# Patient Record
Sex: MALE | Race: WHITE | NOT HISPANIC OR LATINO | URBAN - METROPOLITAN AREA
[De-identification: names, ages, dates, MRNs, and addresses within clinical notes are randomized per-mention and may not be internally consistent; named-entity substitution may affect disease eponyms.]

---

## 2017-08-09 ENCOUNTER — EMERGENCY (EMERGENCY)
Facility: HOSPITAL | Age: 52
LOS: 1 days | Discharge: HOME | End: 2017-08-09
Admitting: EMERGENCY MEDICINE
Payer: COMMERCIAL

## 2017-08-09 VITALS
TEMPERATURE: 98 F | OXYGEN SATURATION: 99 % | WEIGHT: 164.91 LBS | HEART RATE: 91 BPM | DIASTOLIC BLOOD PRESSURE: 73 MMHG | RESPIRATION RATE: 16 BRPM | SYSTOLIC BLOOD PRESSURE: 106 MMHG

## 2017-08-09 PROCEDURE — 99284 EMERGENCY DEPT VISIT MOD MDM: CPT

## 2017-08-09 PROCEDURE — 99283 EMERGENCY DEPT VISIT LOW MDM: CPT

## 2017-08-09 RX ORDER — EPINEPHRINE 0.3 MG/.3ML
0.3 INJECTION INTRAMUSCULAR; SUBCUTANEOUS
Qty: 2 | Refills: 0 | OUTPATIENT
Start: 2017-08-09

## 2023-06-22 ENCOUNTER — EMERGENCY (EMERGENCY)
Facility: HOSPITAL | Age: 58
LOS: 1 days | Discharge: HOME | End: 2023-06-22
Attending: EMERGENCY MEDICINE
Payer: COMMERCIAL

## 2023-06-22 VITALS
SYSTOLIC BLOOD PRESSURE: 111 MMHG | DIASTOLIC BLOOD PRESSURE: 78 MMHG | RESPIRATION RATE: 16 BRPM | OXYGEN SATURATION: 98 % | TEMPERATURE: 98 F | HEART RATE: 64 BPM

## 2023-06-22 VITALS — WEIGHT: 164.91 LBS | HEIGHT: 74 IN

## 2023-06-22 PROCEDURE — 90377 RABIES IG HT&SOL HUMAN IM/SC: CPT

## 2023-06-22 PROCEDURE — 99284 EMERGENCY DEPT VISIT MOD MDM: CPT

## 2023-06-22 PROCEDURE — 90471 IMMUNIZATION ADMIN: CPT

## 2023-06-22 PROCEDURE — 90715 TDAP VACCINE 7 YRS/> IM: CPT

## 2023-06-22 PROCEDURE — 90472 IMMUNIZATION ADMIN EACH ADD: CPT

## 2023-06-22 PROCEDURE — 90675 RABIES VACCINE IM: CPT

## 2023-06-22 PROCEDURE — 99283 EMERGENCY DEPT VISIT LOW MDM: CPT | Mod: 25

## 2023-06-22 PROCEDURE — 96372 THER/PROPH/DIAG INJ SC/IM: CPT

## 2023-06-22 RX ORDER — TETANUS TOXOID, REDUCED DIPHTHERIA TOXOID AND ACELLULAR PERTUSSIS VACCINE, ADSORBED 5; 2.5; 8; 8; 2.5 [IU]/.5ML; [IU]/.5ML; UG/.5ML; UG/.5ML; UG/.5ML
0.5 SUSPENSION INTRAMUSCULAR ONCE
Refills: 0 | Status: COMPLETED | OUTPATIENT
Start: 2023-06-22 | End: 2023-06-22

## 2023-06-22 RX ORDER — HUMAN RABIES VIRUS IMMUNE GLOBULIN 150 [IU]/ML
1500 INJECTION, SOLUTION INTRAMUSCULAR ONCE
Refills: 0 | Status: COMPLETED | OUTPATIENT
Start: 2023-06-22 | End: 2023-06-22

## 2023-06-22 RX ORDER — RABIES VACC, HUMAN DIPLOID/PF 2.5 UNIT
1 VIAL (EA) INTRAMUSCULAR ONCE
Refills: 0 | Status: COMPLETED | OUTPATIENT
Start: 2023-06-22 | End: 2023-06-22

## 2023-06-22 RX ADMIN — Medication 1 TABLET(S): at 12:31

## 2023-06-22 RX ADMIN — HUMAN RABIES VIRUS IMMUNE GLOBULIN 1500 UNIT(S): 150 INJECTION, SOLUTION INTRAMUSCULAR at 13:04

## 2023-06-22 RX ADMIN — TETANUS TOXOID, REDUCED DIPHTHERIA TOXOID AND ACELLULAR PERTUSSIS VACCINE, ADSORBED 0.5 MILLILITER(S): 5; 2.5; 8; 8; 2.5 SUSPENSION INTRAMUSCULAR at 12:37

## 2023-06-22 RX ADMIN — Medication 1 MILLILITER(S): at 12:31

## 2023-06-22 NOTE — ED ADULT NURSE NOTE - HISTORY OF COVID-19 VACCINATION
Dr. Brad Wilkerson gave me a prescription for medication for my stomach.  It did not help.  My stomach continues to hurt on a daily basis. Hyla Celina and every time that I eat, I get a \"stomach ache\".  Please give me a referral to see Dr. Marquita Kline. Thank you. Yes

## 2023-06-22 NOTE — ED ADULT NURSE NOTE - OBJECTIVE STATEMENT
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 Pt states cat did not have collar or any type of identification. Pt states this happened about an hour prior to evaluation in ED. Puncture wounds noted to area of bite, pt has full sensation and ROM in upper extremities and hands, no redness or swelling around bits, radial pulses present and equal bilaterally. Pt denies any other medical complaints. A&Ox4, breathing spontaneously and unlabored, speaking full sentences, moving all extremities, is ambulatory.

## 2023-06-22 NOTE — ED PROVIDER NOTE - CARE PLAN
1 Principal Discharge DX:	Cat bite of finger   Principal Discharge DX:	Open cat bite of hand  Goal:	bilateral

## 2023-06-22 NOTE — ED PROVIDER NOTE - OBJECTIVE STATEMENT
HPI & ROS: 58-year-old male history of emphysema, unknown last tetanus, unknown last rabies, presenting approximately 1 hour after a cat bite.  Presumed feral, patient was outside trying to  a cat and was bitten once on his left hand and his right hand.  Bleeding controlled.   No fevers no chills, no skin erythema.  Patient has a wound over the dorsal aspect of his first webspace on his left hand in the first webspace on his right hand.  Neurovascular intact.  Able to move both hands and flex and extend fully.

## 2023-06-22 NOTE — ED PROVIDER NOTE - PATIENT PORTAL LINK FT
You can access the FollowMyHealth Patient Portal offered by [facility] by registering at the following website: [email or web address]. By joining [employer]’s FollowMyHealth portal, you will also be able to view your health information using other applications (apps) compatible with our system.

## 2023-06-22 NOTE — ED PROVIDER NOTE - ATTENDING CONTRIBUTION TO CARE
Attending MD Siu: I personally have seen and examined this patient.  Resident note reviewed and agree on plan of care and except where noted.  See below for details.     Seen in [facility]    58M with PMH/PSH including emphysema, quit tobacco presents to the ED with cat bites to bilateral hands.  Reports that  were trying to help injured stray cat, he attempted to help, in trying to get cat into box, cat bit him through thick gloves.  Reports he is R hand dominant.  Unknown last tetanus.  Reports washed hands and has been trying to express area since time of incident at 10am.  Denies limitation of motor function or loss of sensory.  Denies redness or purulence to area.  Denies other complaints.     Exam:   General: NAD  HENT: head NCAT, airway patent   Chest: symmetric chest rise, no increased work of breathing  MSK: ranging neck freely, FROM at bilateral hands, cap refill <2s, +2 radials, +R hand with 1cm avulsion injury at interdigit web space between thumb and index finger, +L hand with 2 wounds in interdigit web space between thumb and index finger: one 2mm superficial linear wound and the other at 0.5cm wound, wounds all explored to base in bloodless field with no retained foreign body  Neuro: moving all extremities spontaneously, sensory grossly intact, no gross neuro deficits  Psych: normal mood and affect     A/P: 58M with cat bites to bilateral hands, given reported stray, will give rabies IG, rabies vaccine and will update tetanus, will need to return on Days 3, 7, 14.  Will fill out animal bite form.

## 2023-06-22 NOTE — ED PROVIDER NOTE - CLINICAL SUMMARY MEDICAL DECISION MAKING FREE TEXT BOX
DDx:   Exam and history not consistent with infection of the tendon or ligament.  Of the tendon sheath.  Likely isolated cat bite left and right, will presume feral at this time.  Rabies and tetanus unknown. FB possible as well   Labs: none   Imaging: xray L R hand   Tx:  rabies tetanus  Consults/Resources:   Dispo:    Triage note reviewed. VS reviewed. EKG reviewed and documented in "RESULTS" section, if possible at given time.     DDx in MDM includes the most likely ddx, but is not limited to solely what is listed. Progress notes written as needed, and are included in "PROGRESS NOTE" section below.       Medical, family, and social determinants of health reviewed and discussed w/ pt/family/caretaker, when allowable, and is incorporated into note above, whenever possible.

## 2023-06-22 NOTE — ED PROVIDER NOTE - PHYSICAL EXAMINATION
Exam as stated below:   CONSTITUTIONAL: In NAD.   SKIN: Warm dry. No rashes noted.   EYES: No scleral icterus. Conjunctiva pink.  MSK: No pedal edema. No calf tenderness.   Patient able to range and give A-OK sign with both hands.  Flexion extension intact for all digits and at the wrist as well without pain or limitation.  No erythema noted around bites.  2 punctate bites on the left web, with 1 larger punctate bite on the right web.  NEURO: UE/LE grossly intact. Motor UE/LE sensation grossly intact. CN II-XII grossly intact.   PSYCH: Cooperative, appropriate.

## 2023-06-22 NOTE — ED PROVIDER NOTE - NSFOLLOWUPINSTRUCTIONS_ED_ALL_ED_FT
-     - Your testing/exams was/were reassuring that dangerous emergencies/conditions are less likely to be occurring or to have occurred.    - Take all medications, if given/sent to pharmacy, and as, directed.    - If you had labs or imaging done, you were given copies of all labs and/or imaging results from your er visit--please take them with you to your follow up appointments.  - If needed, call patient access services at [phone] to find a primary care physician (PCP). Call this number to follow up with a specialty service, such as the spine clinic. If you need this, call and say you were recently in the emergency department and you are calling, per my orders.   - Make sure you do not require a primary care physician's referral if you make a specialty clinic appointment directly. Some insurance requires you to see your PCP, get a referral, then make a specialty appointment. - You were seen today after being bitten by a cat whose vaccination and health status is unknown.      - Today you received:  -- TDAP vaccine booster   -- Rabies vaccine (today is considered Day 0)  -- Rabies immunoglobulin.    You need to return on:  Day 3 (6/25/23)  Day 7 (6/29/23)  Day 14 (7/6/23)    - Take home medications as directed.     - You were prescribed AUGMENTIN.  Please take this antibiotics twice daily until you have finished all the pills.    FOR PAIN, YOU MAY TAKE TYLENOL (ACETAMINOPHEN) AND/OR IBUPROFEN (Advil or Motrin). FOLLOW THE INSTRUCTIONS ON THE LABEL/CONTAINER.  DO NOT EXCEED 4000MG OF TYLENOL (ACETAMINOPHEN) IN A 24 HOUR PERIOD.    RETURN TO THE EMERGENCY DEPARTMENT IF YOU EXPERIENCE ANY NEW/CONCERNING/WORSENING SYMPTOMS SUCH AS BUT NOT LIMITED TO: swelling, severe pain, redness, or bleeding at the vaccine or immunoglobulin injection site, fever, bad smell coming from wound or dressing, severe pain, increased redness, swelling, or pain at the site of your wound, fluid, blood, or pus coming from your wound, rash, red streak going away from wound, you cannot properly move area of wound, if extremity with wound is pale or blue or any other concerns.      - If you had labs or imaging done, you were given copies of all labs and/or imaging results from your er visit--please take them with you to your follow up appointments.  - If needed, call patient access services at [phone] to find a primary care physician (PCP). Call this number to follow up with a specialty service, such as the spine clinic. If you need this, call and say you were recently in the emergency department and you are calling, per my orders.   - Make sure you do not require a primary care physician's referral if you make a specialty clinic appointment directly. Some insurance requires you to see your PCP, get a referral, then make a specialty appointment.

## 2023-06-22 NOTE — ED ADULT NURSE NOTE - NSFALLUNIVINTERV_ED_ALL_ED
Bed/Stretcher in lowest position, wheels locked, appropriate side rails in place/Call bell, personal items and telephone in reach/Instruct patient to call for assistance before getting out of bed/chair/stretcher/Non-slip footwear applied when patient is off stretcher/[place] to call system/Physically safe environment - no spills, clutter or unnecessary equipment/Purposeful proactive rounding/Room/bathroom lighting operational, light cord in reach

## 2023-06-25 ENCOUNTER — EMERGENCY (EMERGENCY)
Facility: HOSPITAL | Age: 58
LOS: 1 days | Discharge: HOME | End: 2023-06-25
Attending: PERSONAL EMERGENCY RESPONSE ATTENDANT
Payer: COMMERCIAL

## 2023-06-25 VITALS
DIASTOLIC BLOOD PRESSURE: 67 MMHG | HEIGHT: 74 IN | TEMPERATURE: 98 F | WEIGHT: 164.91 LBS | RESPIRATION RATE: 18 BRPM | OXYGEN SATURATION: 97 % | SYSTOLIC BLOOD PRESSURE: 112 MMHG | HEART RATE: 67 BPM

## 2023-06-25 PROCEDURE — 99284 EMERGENCY DEPT VISIT MOD MDM: CPT

## 2023-06-25 PROCEDURE — 90675 RABIES VACCINE IM: CPT

## 2023-06-25 PROCEDURE — 73130 X-RAY EXAM OF HAND: CPT

## 2023-06-25 PROCEDURE — 99283 EMERGENCY DEPT VISIT LOW MDM: CPT | Mod: 25

## 2023-06-25 PROCEDURE — 90471 IMMUNIZATION ADMIN: CPT

## 2023-06-25 PROCEDURE — 73130 X-RAY EXAM OF HAND: CPT | Mod: 26,RT

## 2023-06-25 RX ORDER — RABIES VACC, HUMAN DIPLOID/PF 2.5 UNIT
1 VIAL (EA) INTRAMUSCULAR ONCE
Refills: 0 | Status: COMPLETED | OUTPATIENT
Start: 2023-06-25 | End: 2023-06-25

## 2023-06-25 RX ADMIN — Medication 1 MILLILITER(S): at 11:42

## 2023-06-25 NOTE — ED PROVIDER NOTE - PATIENT PORTAL LINK FT
You can access the FollowMyHealth Patient Portal offered by [facility] by registering at the following website: [email or web address]. By joining [employer]’s FollowMyHealth portal, you will also be able to view your health information using other applications (apps) compatible with our system.

## 2023-06-25 NOTE — ED ADULT NURSE NOTE - NSFALLUNIVINTERV_ED_ALL_ED
Bed/Stretcher in lowest position, wheels locked, appropriate side rails in place/Call bell, personal items and telephone in reach/Instruct patient to call for assistance before getting out of bed/chair/stretcher/Non-slip footwear applied when patient is off stretcher/[place] to call system/Physically safe environment - no spills, clutter or unnecessary equipment/Purposeful proactive rounding/Room/bathroom lighting operational, light cord in reach

## 2023-06-25 NOTE — ED PROVIDER NOTE - PHYSICAL EXAMINATION
Const: Well-nourished, Well-developed, appearing stated age.  Eyes: no conjunctival injection, and symmetrical lids.  HEENT: Head NCAT, no lesions. Atraumatic external nose and ears. Moist MM.  Neck: Symmetric, trachea midline.   RESP: Unlabored respiratory effort.   MSK:   TTP over right index medical.  Range of motion intact  Skin:   Right hand well-healing bite marks, mild erythema around site but not on edematous, not spreading.  No purulent drainage from site.  Neuro: CNs II-XII grossly intact. Motor & Sensation grossly intact.  Psych: Awake, Alert, & Oriented (AAO) x3. Appropriate mood and affect.

## 2023-06-25 NOTE — ED PROVIDER NOTE - OBJECTIVE STATEMENT
58-year-old male no past medical history presenting to the ED with follow-up rabies vaccination.  Patient got his first shot for day 0 and received Tdap at the same time.,   Patient is taking his Augmentin but notes that he still has pain in the right hand,  specifically tenderness over the right first knuckle.  Mild erythema around the site but no purulent drainage, no sever tenderness. no f/c, has been moving his fingers normally.

## 2023-06-25 NOTE — ED PROVIDER NOTE - CLINICAL SUMMARY MEDICAL DECISION MAKING FREE TEXT BOX
58-year-old male no history presenting with likely need for day 3 of rabies vaccination.  Patient complaining of pain in the right knuckle.  But is able to range.  Low suspicion for tenosynovitis as this finger is not swollen hot and tender and can range it.  Patient had does not look cellulitic or infected at this time.  We will get x-rays to ensure no foreign body versus fracture in the hand 58-year-old male no history presenting with likely need for day 3 of rabies vaccination.  Patient complaining of pain in the right knuckle.  But is able to range.  Low suspicion for tenosynovitis as this finger is not swollen hot and tender and can range it.  Patient had does not look cellulitic or infected at this time.  We will get x-rays to ensure no foreign body versus fracture in the hand    Attending MD Naik.  Agree with above.  Pt is a 57 yo male with pmhx of bit by cat few days ago.  Returns to ED for day 3 rabies vaccine.  Taking augmentin as rxed.  Pain over R 2nd MCP joint with limited ROM 2/2 pain .  No focal erythema/warmth/induration.  Site visually healing well.  TTP over puncture wounds focally without induration/palpable FB's.  XR's reviewed without FB's appreciated.  Pt endorses fever previously on Thursday night of unclear significance.  No recurrent fevers.  PT afebrile today, sites non-erythematous.  Extensive discussion with pt re: need to return to ED for fevers/chills/erythema, warmth, n/v/d.  Able to make fist and articulate thumb to all digits with minimal limitation 2/2 pain.  Pt aware of need to return to ED next on day

## 2023-06-25 NOTE — ED PROVIDER NOTE - ATTENDING CONTRIBUTION TO CARE
Attending MD Naik:  I performed a history and physical exam of the patient and discussed their management with the resident. I reviewed the resident's note and agree with the documented findings and plan of care. My medical decision making and observations are found above.

## 2023-06-25 NOTE — ED PROVIDER NOTE - ADDITIONAL NOTES AND INSTRUCTIONS:
This patient was seen in the Emergency Department. Please avoid any strenuous physical with your hands at this time until cleared by a physician.

## 2023-06-25 NOTE — ED PROVIDER NOTE - PROGRESS NOTE DETAILS
Oneil Carreno, PGY2 Show no acute findings.  Patient received vaccine with no acute issues.  Feels well at bedside.  Will DC with return cautions.

## 2023-06-25 NOTE — ED ADULT NURSE NOTE - OBJECTIVE STATEMENT
58 year old male here for second rabies shot.  Pt was bitten by stray cast Pt has scabs on the right had and endorsees pain On the  first knuckle .  no redness no fever chills noted Jamari

## 2023-06-25 NOTE — ED PROVIDER NOTE - NSFOLLOWUPINSTRUCTIONS_ED_ALL_ED_FT
- You were seen today after being bitten by a cat whose vaccination and health status is unknown.      - Today you received:  -- Rabies vaccine (today is considered Day 3)    You need to return on:  Day 7 (6/29/23)  Day 14 (7/6/23)    FOR PAIN, YOU MAY TAKE TYLENOL (ACETAMINOPHEN) AND/OR IBUPROFEN (Advil or Motrin). FOLLOW THE INSTRUCTIONS ON THE LABEL/CONTAINER.  DO NOT EXCEED 4000MG OF TYLENOL (ACETAMINOPHEN) IN A 24 HOUR PERIOD.    RETURN TO THE EMERGENCY DEPARTMENT IF YOU EXPERIENCE ANY NEW/CONCERNING/WORSENING SYMPTOMS SUCH AS BUT NOT LIMITED TO: swelling, severe pain, redness, or bleeding at the vaccine or immunoglobulin injection site, fever, bad smell coming from wound or dressing, severe pain, increased redness, swelling, or pain at the site of your wound, fluid, blood, or pus coming from your wound, rash, red streak going away from wound, you cannot properly move area of wound, if extremity with wound is pale or blue or any other concerns.      - If you had labs or imaging done, you were given copies of all labs and/or imaging results from your er visit--please take them with you to your follow up appointments.  - If needed, call patient access services at [phone] to find a primary care physician (PCP). Call this number to follow up with a specialty service, such as the spine clinic. If you need this, call and say you were recently in the emergency department and you are calling, per my orders.   - Make sure you do not require a primary care physician's referral if you make a specialty clinic appointment directly. Some insurance requires you to see your PCP, get a referral, then make a specialty appointment.

## 2023-06-30 ENCOUNTER — EMERGENCY (EMERGENCY)
Facility: HOSPITAL | Age: 58
LOS: 1 days | Discharge: HOME | End: 2023-06-30
Attending: EMERGENCY MEDICINE
Payer: COMMERCIAL

## 2023-06-30 VITALS
WEIGHT: 164.91 LBS | HEART RATE: 64 BPM | TEMPERATURE: 99 F | DIASTOLIC BLOOD PRESSURE: 77 MMHG | HEIGHT: 74 IN | SYSTOLIC BLOOD PRESSURE: 111 MMHG | RESPIRATION RATE: 16 BRPM | OXYGEN SATURATION: 98 %

## 2023-06-30 PROCEDURE — 90471 IMMUNIZATION ADMIN: CPT

## 2023-06-30 PROCEDURE — 90675 RABIES VACCINE IM: CPT

## 2023-06-30 PROCEDURE — 99281 EMR DPT VST MAYX REQ PHY/QHP: CPT | Mod: 25

## 2023-06-30 PROCEDURE — 99283 EMERGENCY DEPT VISIT LOW MDM: CPT

## 2023-06-30 RX ORDER — RABIES VACC, HUMAN DIPLOID/PF 2.5 UNIT
1 VIAL (EA) INTRAMUSCULAR ONCE
Refills: 0 | Status: COMPLETED | OUTPATIENT
Start: 2023-06-30 | End: 2023-06-30

## 2023-06-30 RX ADMIN — Medication 1 MILLILITER(S): at 10:50

## 2023-06-30 NOTE — ED ADULT NURSE NOTE - CHIEF COMPLAINT QUOTE
LMTCB to schedule an MA only appointment for a blood pressure re-check.    Roula Erickson  
needs second rabies shot

## 2023-06-30 NOTE — ED PROVIDER NOTE - OBJECTIVE STATEMENT
58-year-old male no past medical history presenting to the ED with follow-up rabies vaccination.  Patient got his first shot for day 0 and received Tdap at the same time., Now here for Day 7 rabies vaccine.  No symptoms.

## 2023-06-30 NOTE — ED ADULT NURSE NOTE - NSFALLUNIVINTERV_ED_ALL_ED
Bed/Stretcher in lowest position, wheels locked, appropriate side rails in place/Call bell, personal items and telephone in reach/Instruct patient to call for assistance before getting out of bed/chair/stretcher/Non-slip footwear applied when patient is off stretcher/[place] to call system/Physically safe environment - no spills, clutter or unnecessary equipment/Purposeful proactive rounding/Room/bathroom lighting operational, light cord in reach

## 2023-06-30 NOTE — ED PROVIDER NOTE - PATIENT PORTAL LINK FT
You can access the FollowMyHealth Patient Portal offered by [facility] by registering at the following website: [email or web address]. By joining [employer]’s FollowMyHealth portal, you will also be able to view your health information using other applications (apps) compatible with our system.

## 2023-06-30 NOTE — ED PROVIDER NOTE - NSFOLLOWUPINSTRUCTIONS_ED_ALL_ED_FT
Follow up on July 6th for Day 14 rabies vaccination.  This will be your last shot.  Return to ER for any concerning symptoms or increased pain at bite sites.

## 2023-06-30 NOTE — ED ADULT NURSE NOTE - DOES PATIENT HAVE ADVANCE DIRECTIVE
Can stop the specific iron supplement.  Michelle Rivera PA-C 4/21/2022 12:05 AM   
Labs improving. Please have her take a MVI with iron daily from now on.  Michelle Rivera PA-C 4/21/2022 12:04 AM   
Routing to [facility]. 
Today's visit was performed with the assistance of  Services.   Name of : Thanh [id number]   Service used: Phone : Third Party. Patient notified. Patient verbalized understanding of information given.         
Unk

## 2023-07-07 ENCOUNTER — EMERGENCY (EMERGENCY)
Facility: HOSPITAL | Age: 58
LOS: 1 days | Discharge: HOME | End: 2023-07-07
Attending: EMERGENCY MEDICINE
Payer: COMMERCIAL

## 2023-07-07 VITALS
OXYGEN SATURATION: 99 % | SYSTOLIC BLOOD PRESSURE: 109 MMHG | WEIGHT: 164.91 LBS | DIASTOLIC BLOOD PRESSURE: 74 MMHG | HEART RATE: 67 BPM | TEMPERATURE: 98 F | RESPIRATION RATE: 20 BRPM | HEIGHT: 74 IN

## 2023-07-07 PROCEDURE — 90675 RABIES VACCINE IM: CPT

## 2023-07-07 PROCEDURE — 90471 IMMUNIZATION ADMIN: CPT

## 2023-07-07 PROCEDURE — 99281 EMR DPT VST MAYX REQ PHY/QHP: CPT | Mod: 25

## 2023-07-07 PROCEDURE — 99283 EMERGENCY DEPT VISIT LOW MDM: CPT

## 2023-07-07 RX ORDER — RABIES VACC, HUMAN DIPLOID/PF 2.5 UNIT
1 VIAL (EA) INTRAMUSCULAR ONCE
Refills: 0 | Status: COMPLETED | OUTPATIENT
Start: 2023-07-07 | End: 2023-07-07

## 2023-07-07 RX ADMIN — Medication 1 MILLILITER(S): at 10:31

## 2023-07-07 NOTE — ED ADULT NURSE NOTE - OBJECTIVE STATEMENT
pt is a 58 yr old male who was bit by a feral cat a few weeks ago- has received the other rabies shots and is here today for final shot. Site shows no signs of infection.

## 2023-07-07 NOTE — ED PROVIDER NOTE - NSFOLLOWUPINSTRUCTIONS_ED_ALL_ED_FT
You received your final rabies vaccination today.     Please follow up with your primary care physician as needed in the next 5-7 days

## 2023-07-07 NOTE — ED PROVIDER NOTE - PHYSICAL EXAMINATION
GENERAL: AAOx4, GCS 15, NAD, WDWN   HEENT: MMM, no jugular venous distension, supple neck, PERRLA, EOMI, nonicteric sclera  PULM: CTA B, no crackles/rubs/rales  CV: RRR, S1S2, no MRG  ABD: Flat abdomen, NTND, no R/G/R, no CVAT.    MSK: [place], +2 pulses x4  NEURO: No obvious focal deficits  PSYCH: AAOx3, clear thought and normal sensorium

## 2023-07-07 NOTE — ED PROVIDER NOTE - PATIENT PORTAL LINK FT
You can access the FollowMyHealth Patient Portal offered by [facility] by registering at the following website: [email or web address]. By joining [employer]’s FollowMyHealth portal, you will also be able to view your health information using other applications (apps) compatible with our system.

## 2023-07-07 NOTE — ED PROVIDER NOTE - OBJECTIVE STATEMENT
Rabies f/u.  Last injection.  No reactions to vaccinations thus far.  No swelling/erythema at finger.  No f/c, n/v, dysphagia.
